# Patient Record
Sex: MALE | Race: WHITE | Employment: STUDENT | URBAN - METROPOLITAN AREA
[De-identification: names, ages, dates, MRNs, and addresses within clinical notes are randomized per-mention and may not be internally consistent; named-entity substitution may affect disease eponyms.]

---
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
## 2022-02-15 ENCOUNTER — OFFICE VISIT (OUTPATIENT)
Dept: FAMILY MEDICINE CLINIC | Facility: CLINIC | Age: 22
End: 2022-02-15
Payer: COMMERCIAL

## 2022-02-15 VITALS
WEIGHT: 162.19 LBS | RESPIRATION RATE: 14 BRPM | DIASTOLIC BLOOD PRESSURE: 72 MMHG | SYSTOLIC BLOOD PRESSURE: 104 MMHG | TEMPERATURE: 98 F | HEART RATE: 76 BPM | BODY MASS INDEX: 25 KG/M2

## 2022-02-15 DIAGNOSIS — F90.9 ADHD (ATTENTION DEFICIT HYPERACTIVITY DISORDER): ICD-10-CM

## 2022-02-15 PROCEDURE — 3078F DIAST BP <80 MM HG: CPT | Performed by: FAMILY MEDICINE

## 2022-02-15 PROCEDURE — 99214 OFFICE O/P EST MOD 30 MIN: CPT | Performed by: FAMILY MEDICINE

## 2022-02-15 PROCEDURE — 3074F SYST BP LT 130 MM HG: CPT | Performed by: FAMILY MEDICINE

## 2022-02-15 RX ORDER — DEXTROAMPHETAMINE SACCHARATE, AMPHETAMINE ASPARTATE MONOHYDRATE, DEXTROAMPHETAMINE SULFATE AND AMPHETAMINE SULFATE 2.5; 2.5; 2.5; 2.5 MG/1; MG/1; MG/1; MG/1
10 CAPSULE, EXTENDED RELEASE ORAL EVERY MORNING
Qty: 30 CAPSULE | Refills: 0 | Status: SHIPPED | OUTPATIENT
Start: 2022-02-15 | End: 2022-03-17

## 2022-02-15 RX ORDER — FLUTICASONE PROPIONATE 50 MCG
1-2 SPRAY, SUSPENSION (ML) NASAL DAILY
COMMUNITY
Start: 2022-02-05

## 2022-06-06 ENCOUNTER — OFFICE VISIT (OUTPATIENT)
Dept: FAMILY MEDICINE CLINIC | Facility: CLINIC | Age: 22
End: 2022-06-06
Payer: COMMERCIAL

## 2022-06-06 ENCOUNTER — PATIENT MESSAGE (OUTPATIENT)
Dept: FAMILY MEDICINE CLINIC | Facility: CLINIC | Age: 22
End: 2022-06-06

## 2022-06-06 VITALS
HEART RATE: 74 BPM | BODY MASS INDEX: 25.54 KG/M2 | RESPIRATION RATE: 16 BRPM | TEMPERATURE: 98 F | OXYGEN SATURATION: 98 % | SYSTOLIC BLOOD PRESSURE: 116 MMHG | WEIGHT: 155.13 LBS | DIASTOLIC BLOOD PRESSURE: 80 MMHG | HEIGHT: 65.25 IN

## 2022-06-06 DIAGNOSIS — Z00.00 ROUTINE HEALTH MAINTENANCE: Primary | ICD-10-CM

## 2022-06-06 PROCEDURE — 90715 TDAP VACCINE 7 YRS/> IM: CPT | Performed by: FAMILY MEDICINE

## 2022-06-06 PROCEDURE — 99395 PREV VISIT EST AGE 18-39: CPT | Performed by: FAMILY MEDICINE

## 2022-06-06 PROCEDURE — 3008F BODY MASS INDEX DOCD: CPT | Performed by: FAMILY MEDICINE

## 2022-06-06 PROCEDURE — 3074F SYST BP LT 130 MM HG: CPT | Performed by: FAMILY MEDICINE

## 2022-06-06 PROCEDURE — 3079F DIAST BP 80-89 MM HG: CPT | Performed by: FAMILY MEDICINE

## 2022-06-06 PROCEDURE — 90471 IMMUNIZATION ADMIN: CPT | Performed by: FAMILY MEDICINE

## 2022-06-06 RX ORDER — LISDEXAMFETAMINE DIMESYLATE 10 MG/1
10 CAPSULE ORAL DAILY
Qty: 10 CAPSULE | Refills: 0 | Status: SHIPPED | OUTPATIENT
Start: 2022-06-06 | End: 2022-06-16

## 2022-06-06 NOTE — TELEPHONE ENCOUNTER
From: Dhruv Davenport  To: Janes Bloom DO  Sent: 6/6/2022 5:52 PM CDT  Subject: [facility] [facility] health forms    I found page C

## 2022-06-08 ENCOUNTER — PATIENT MESSAGE (OUTPATIENT)
Dept: FAMILY MEDICINE CLINIC | Facility: CLINIC | Age: 22
End: 2022-06-08

## 2022-06-08 NOTE — TELEPHONE ENCOUNTER
From: Tyron Davenport  To: Camille Silva DO  Sent: 6/8/2022 8:15 AM CDT  Subject: See if that works     [email or web address]

## 2022-06-20 RX ORDER — LISDEXAMFETAMINE DIMESYLATE 10 MG/1
10 CAPSULE ORAL DAILY
Qty: 30 CAPSULE | Refills: 0 | Status: SHIPPED | OUTPATIENT
Start: 2022-06-20 | End: 2022-07-20

## 2022-06-20 NOTE — TELEPHONE ENCOUNTER
Pt states that he feels the vyvanse works better in regard to the other medication- he states he can focus and still sleep at night. Pt states he could try to a high dose at this time- and it if it is too much he can let us know? But he does feel like this medication is better than the adderall.

## 2022-06-20 NOTE — TELEPHONE ENCOUNTER
Lisdexamfetamine Dimesylate (VYVANSE) 10 MG Oral Cap [170201]       PT WOULD LIKE REFILL SENT TO  [facility] [address]. [phone], [phone]

## 2022-07-19 RX ORDER — LISDEXAMFETAMINE DIMESYLATE 10 MG/1
10 CAPSULE ORAL DAILY
Qty: 30 CAPSULE | Refills: 0 | Status: SHIPPED | OUTPATIENT
Start: 2022-07-19 | End: 2022-08-18

## 2022-12-27 ENCOUNTER — OFFICE VISIT (OUTPATIENT)
Dept: FAMILY MEDICINE CLINIC | Facility: CLINIC | Age: 22
End: 2022-12-27
Payer: COMMERCIAL

## 2022-12-27 VITALS
HEART RATE: 84 BPM | DIASTOLIC BLOOD PRESSURE: 72 MMHG | SYSTOLIC BLOOD PRESSURE: 112 MMHG | BODY MASS INDEX: 25 KG/M2 | OXYGEN SATURATION: 95 % | TEMPERATURE: 99 F | WEIGHT: 150 LBS | RESPIRATION RATE: 18 BRPM

## 2022-12-27 DIAGNOSIS — J06.9 VIRAL URI: ICD-10-CM

## 2022-12-27 DIAGNOSIS — J02.8 PHARYNGITIS DUE TO OTHER ORGANISM: Primary | ICD-10-CM

## 2022-12-27 DIAGNOSIS — R50.81 FEVER IN OTHER DISEASES: ICD-10-CM

## 2022-12-27 LAB
CONTROL LINE PRESENT WITH A CLEAR BACKGROUND (YES/NO): YES YES/NO
KIT LOT #: 2554 NUMERIC

## 2022-12-27 PROCEDURE — 3078F DIAST BP <80 MM HG: CPT | Performed by: FAMILY MEDICINE

## 2022-12-27 PROCEDURE — 99213 OFFICE O/P EST LOW 20 MIN: CPT | Performed by: FAMILY MEDICINE

## 2022-12-27 PROCEDURE — 3074F SYST BP LT 130 MM HG: CPT | Performed by: FAMILY MEDICINE

## 2022-12-27 PROCEDURE — 87880 STREP A ASSAY W/OPTIC: CPT | Performed by: FAMILY MEDICINE

## 2023-02-23 ENCOUNTER — OFFICE VISIT (OUTPATIENT)
Dept: FAMILY MEDICINE CLINIC | Facility: CLINIC | Age: 23
End: 2023-02-23
Payer: COMMERCIAL

## 2023-02-23 VITALS
RESPIRATION RATE: 18 BRPM | SYSTOLIC BLOOD PRESSURE: 104 MMHG | BODY MASS INDEX: 25 KG/M2 | TEMPERATURE: 97 F | DIASTOLIC BLOOD PRESSURE: 72 MMHG | OXYGEN SATURATION: 99 % | HEART RATE: 72 BPM | WEIGHT: 153.25 LBS

## 2023-02-23 DIAGNOSIS — J01.00 SUBACUTE MAXILLARY SINUSITIS: Primary | ICD-10-CM

## 2023-02-23 DIAGNOSIS — J02.9 PHARYNGITIS, UNSPECIFIED ETIOLOGY: ICD-10-CM

## 2023-02-23 PROCEDURE — 3078F DIAST BP <80 MM HG: CPT | Performed by: FAMILY MEDICINE

## 2023-02-23 PROCEDURE — 99213 OFFICE O/P EST LOW 20 MIN: CPT | Performed by: FAMILY MEDICINE

## 2023-02-23 PROCEDURE — 3074F SYST BP LT 130 MM HG: CPT | Performed by: FAMILY MEDICINE

## 2023-02-23 RX ORDER — DOXYCYCLINE HYCLATE 100 MG
100 TABLET ORAL 2 TIMES DAILY
Qty: 20 TABLET | Refills: 0 | Status: SHIPPED | OUTPATIENT
Start: 2023-02-23 | End: 2023-03-05

## 2023-05-08 ENCOUNTER — HOSPITAL ENCOUNTER (EMERGENCY)
Age: 23
Discharge: HOME | End: 2023-05-08

## 2023-05-08 VITALS
WEIGHT: 157.19 LBS | RESPIRATION RATE: 18 BRPM | BODY MASS INDEX: 25.26 KG/M2 | DIASTOLIC BLOOD PRESSURE: 87 MMHG | OXYGEN SATURATION: 98 % | TEMPERATURE: 98.1 F | SYSTOLIC BLOOD PRESSURE: 130 MMHG | HEART RATE: 80 BPM | HEIGHT: 66 IN

## 2023-05-08 DIAGNOSIS — S61.210A LACERATION OF RIGHT INDEX FINGER WITHOUT FOREIGN BODY WITHOUT DAMAGE TO NAIL, INITIAL ENCOUNTER: Primary | ICD-10-CM

## 2023-05-08 PROCEDURE — 99283 EMERGENCY DEPT VISIT LOW MDM: CPT | Performed by: PHYSICIAN ASSISTANT

## 2023-05-08 PROCEDURE — 99283 EMERGENCY DEPT VISIT LOW MDM: CPT

## 2023-05-08 ASSESSMENT — PAIN DESCRIPTION - PAIN TYPE: TYPE: ACUTE PAIN

## 2023-05-08 ASSESSMENT — PAIN SCALES - GENERAL: PAINLEVEL_OUTOF10: 4

## 2023-07-05 ENCOUNTER — HOSPITAL ENCOUNTER (EMERGENCY)
Age: 23
Discharge: HOME | End: 2023-07-05
Attending: EMERGENCY MEDICINE

## 2023-07-05 VITALS
RESPIRATION RATE: 16 BRPM | SYSTOLIC BLOOD PRESSURE: 126 MMHG | DIASTOLIC BLOOD PRESSURE: 72 MMHG | TEMPERATURE: 97.9 F | HEART RATE: 87 BPM | WEIGHT: 157.63 LBS | OXYGEN SATURATION: 98 %

## 2023-07-05 DIAGNOSIS — S09.21XA TRAUMATIC RUPTURE OF RIGHT EAR DRUM, INITIAL ENCOUNTER: Primary | ICD-10-CM

## 2023-07-05 PROCEDURE — 10002803 HB RX 637: Performed by: EMERGENCY MEDICINE

## 2023-07-05 PROCEDURE — 99283 EMERGENCY DEPT VISIT LOW MDM: CPT | Performed by: EMERGENCY MEDICINE

## 2023-07-05 PROCEDURE — 99282 EMERGENCY DEPT VISIT SF MDM: CPT

## 2023-07-05 RX ORDER — NEOMYCIN SULFATE, POLYMYXIN B SULFATE AND HYDROCORTISONE 10; 3.5; 1 MG/ML; MG/ML; [USP'U]/ML
3 SUSPENSION/ DROPS AURICULAR (OTIC) EVERY 6 HOURS SCHEDULED
Status: DISCONTINUED | OUTPATIENT
Start: 2023-07-05 | End: 2023-07-06 | Stop reason: HOSPADM

## 2023-07-05 RX ADMIN — NEOMYCIN SULFATE, POLYMYXIN B SULFATE AND HYDROCORTISONE 3 DROP: 10; 3.5; 1 SUSPENSION/ DROPS AURICULAR (OTIC) at 21:45

## 2023-07-05 ASSESSMENT — ENCOUNTER SYMPTOMS
NUMBNESS: 0
DIZZINESS: 0
WEAKNESS: 0
HEADACHES: 0
TREMORS: 0
FACIAL ASYMMETRY: 0
SEIZURES: 0
SPEECH DIFFICULTY: 0
LIGHT-HEADEDNESS: 0

## 2023-07-05 ASSESSMENT — PAIN SCALES - GENERAL: PAINLEVEL_OUTOF10: 0

## 2023-07-11 ENCOUNTER — OFFICE VISIT (OUTPATIENT)
Dept: INTERNAL MEDICINE | Age: 23
End: 2023-07-11

## 2023-07-11 VITALS
SYSTOLIC BLOOD PRESSURE: 118 MMHG | BODY MASS INDEX: 24.57 KG/M2 | DIASTOLIC BLOOD PRESSURE: 76 MMHG | OXYGEN SATURATION: 98 % | HEART RATE: 94 BPM | WEIGHT: 156.53 LBS | TEMPERATURE: 97.9 F | HEIGHT: 67 IN

## 2023-07-11 DIAGNOSIS — G89.29 CHRONIC MID BACK PAIN: ICD-10-CM

## 2023-07-11 DIAGNOSIS — M54.9 CHRONIC MID BACK PAIN: ICD-10-CM

## 2023-07-11 DIAGNOSIS — Z00.00 PREVENTATIVE HEALTH CARE: Primary | ICD-10-CM

## 2023-07-11 PROBLEM — Z86.59 HISTORY OF ADHD: Status: ACTIVE | Noted: 2023-07-11

## 2023-07-11 PROBLEM — Z86.59 HISTORY OF ADHD: Status: RESOLVED | Noted: 2023-07-11 | Resolved: 2023-07-11

## 2023-07-11 PROCEDURE — 99204 OFFICE O/P NEW MOD 45 MIN: CPT | Performed by: INTERNAL MEDICINE

## 2023-07-11 RX ORDER — ACETAMINOPHEN 500 MG
500 TABLET ORAL EVERY 4 HOURS PRN
COMMUNITY

## 2023-07-11 RX ORDER — IBUPROFEN 800 MG/1
400 TABLET ORAL EVERY 8 HOURS PRN
COMMUNITY
Start: 2023-04-13

## 2023-07-17 ENCOUNTER — TELEPHONE (OUTPATIENT)
Dept: INTERNAL MEDICINE | Age: 23
End: 2023-07-17

## 2023-07-17 DIAGNOSIS — G89.29 CHRONIC MID BACK PAIN: Primary | ICD-10-CM

## 2023-07-17 DIAGNOSIS — M54.9 CHRONIC MID BACK PAIN: Primary | ICD-10-CM

## 2023-07-19 ENCOUNTER — APPOINTMENT (OUTPATIENT)
Dept: REHABILITATION | Age: 23
End: 2023-07-19
Attending: INTERNAL MEDICINE

## 2023-07-27 ENCOUNTER — TELEPHONE (OUTPATIENT)
Dept: REHABILITATION | Age: 23
End: 2023-07-27

## 2023-12-31 ENCOUNTER — WALK IN (OUTPATIENT)
Dept: URGENT CARE | Age: 23
End: 2023-12-31

## 2023-12-31 ENCOUNTER — TELEPHONE (OUTPATIENT)
Dept: URGENT CARE | Age: 23
End: 2023-12-31

## 2023-12-31 VITALS
SYSTOLIC BLOOD PRESSURE: 104 MMHG | WEIGHT: 164.35 LBS | HEART RATE: 62 BPM | TEMPERATURE: 97.8 F | BODY MASS INDEX: 25.8 KG/M2 | OXYGEN SATURATION: 96 % | DIASTOLIC BLOOD PRESSURE: 61 MMHG | HEIGHT: 67 IN | RESPIRATION RATE: 16 BRPM

## 2023-12-31 DIAGNOSIS — J02.9 SORE THROAT: Primary | ICD-10-CM

## 2023-12-31 LAB
S PYO DNA THROAT QL NAA+PROBE: NOT DETECTED
TEST LOT EXPIRATION DATE: NORMAL
TEST LOT NUMBER: NORMAL

## 2023-12-31 PROCEDURE — 87651 STREP A DNA AMP PROBE: CPT | Performed by: NURSE PRACTITIONER

## 2023-12-31 PROCEDURE — 0241U COVID/FLU/RSV PANEL: CPT | Performed by: CLINICAL MEDICAL LABORATORY

## 2023-12-31 PROCEDURE — 99203 OFFICE O/P NEW LOW 30 MIN: CPT | Performed by: NURSE PRACTITIONER

## 2024-01-01 ENCOUNTER — TELEPHONE (OUTPATIENT)
Dept: URGENT CARE | Age: 24
End: 2024-01-01

## 2024-01-01 LAB
FLUAV RNA RESP QL NAA+PROBE: NOT DETECTED
FLUBV RNA RESP QL NAA+PROBE: NOT DETECTED
RSV AG NPH QL IA.RAPID: NOT DETECTED
SARS-COV-2 N GENE CT SPEC QN NAA N2: 19.1
SARS-COV-2 RNA RESP QL NAA+PROBE: DETECTED
SERVICE CMNT-IMP: ABNORMAL

## 2024-05-01 ENCOUNTER — APPOINTMENT (OUTPATIENT)
Dept: OPHTHALMOLOGY | Age: 24
End: 2024-05-01

## 2024-05-09 ENCOUNTER — APPOINTMENT (OUTPATIENT)
Dept: OPHTHALMOLOGY | Age: 24
End: 2024-05-09

## 2024-05-09 DIAGNOSIS — H52.13 MYOPIA OF BOTH EYES WITH ASTIGMATISM: Primary | ICD-10-CM

## 2024-05-09 DIAGNOSIS — H52.203 MYOPIA OF BOTH EYES WITH ASTIGMATISM: Primary | ICD-10-CM

## 2024-05-09 DIAGNOSIS — H52.7 DISORDER OF REFRACTION: Primary | ICD-10-CM

## 2024-05-09 PROCEDURE — 92015 DETERMINE REFRACTIVE STATE: CPT

## 2024-05-09 PROCEDURE — 92004 COMPRE OPH EXAM NEW PT 1/>: CPT

## 2024-05-09 PROCEDURE — 92310 CONTACT LENS FITTING OU: CPT

## 2024-05-09 ASSESSMENT — REFRACTION_WEARINGRX
OS_AXIS: 122
OS_CYLINDER: -0.50
OD_SPHERE: -3.25
OS_SPHERE: -3.00

## 2024-05-09 ASSESSMENT — KERATOMETRY
OD_AXISANGLE_DEGREES: 80
OS_AXISANGLE2_DEGREES: 150
OD_K2POWER_DIOPTERS: 42.75
OS_K2POWER_DIOPTERS: 42.50
OS_K1POWER_DIOPTERS: 42.25
OS_AXISANGLE_DEGREES: 60
OD_AXISANGLE2_DEGREES: 170
OD_K1POWER_DIOPTERS: 42.50

## 2024-05-09 ASSESSMENT — REFRACTION_MANIFEST
OD_SPHERE: -3.50
METHOD_AUTOREFRACTION: 1
OS_AXIS: 120
OD_AXIS: 015
OS_CYLINDER: -0.75
OS_SPHERE: -3.00
OD_CYLINDER: -0.25

## 2024-05-09 ASSESSMENT — REFRACTION_CURRENTRX
OS_SPHERE: -3.25
OS_DIAMETER: 14.0
OD_SPHERE: -3.50
OS_BASECURVE: 8.4
OS_BRAND: ACUVUE OASYS
OS_DIAMETER: 14.0
OD_BASECURVE: 8.4
OS_BRAND: ACUVUE OASYS
OD_BRAND: ACUVUE OASYS
OS_SPHERE: -3.50
OD_DIAMETER: 14.0
OS_BASECURVE: 8.4
OD_BASECURVE: 8.4
OD_DIAMETER: 14.0
OD_BRAND: ACUVUE OASYS
OD_SPHERE: -3.25

## 2024-05-09 ASSESSMENT — CONF VISUAL FIELD
OD_INFERIOR_TEMPORAL_RESTRICTION: 0
OS_SUPERIOR_NASAL_RESTRICTION: 0
OS_INFERIOR_TEMPORAL_RESTRICTION: 0
OD_SUPERIOR_NASAL_RESTRICTION: 0
OD_INFERIOR_NASAL_RESTRICTION: 0
OS_NORMAL: 1
OD_SUPERIOR_TEMPORAL_RESTRICTION: 0
OD_NORMAL: 1
OS_SUPERIOR_TEMPORAL_RESTRICTION: 0
OS_INFERIOR_NASAL_RESTRICTION: 0

## 2024-05-09 ASSESSMENT — REFRACTION
OD_SPHERE: -3.75
OS_SPHERE: -3.25
OS_CYLINDER: -0.50
OD_AXIS: 034
OS_AXIS: 128
OD_CYLINDER: -0.25

## 2024-05-09 ASSESSMENT — VISUAL ACUITY
METHOD: SNELLEN- LINEAR
VA_OR_OS_CURRENT_RX: 20/20
OS_CC: 20/20
VA_OR_OD_CURRENT_RX: 20/20
OD_CC: 20/25
OS_SC: 20/400
OD_SC: 20/400
CORRECTION_TYPE: GLASSES

## 2024-05-09 ASSESSMENT — SLIT LAMP EXAM - LIDS
COMMENTS: NORMAL
COMMENTS: NORMAL

## 2024-05-09 ASSESSMENT — EXTERNAL EXAM - LEFT EYE: OS_EXAM: NORMAL

## 2024-05-09 ASSESSMENT — EXTERNAL EXAM - RIGHT EYE: OD_EXAM: NORMAL

## 2024-05-09 ASSESSMENT — TONOMETRY
OS_IOP_MMHG: 17
OD_IOP_MMHG: 17

## 2024-05-09 ASSESSMENT — CUP TO DISC RATIO
OS_RATIO: 0.25
OD_RATIO: 0.3

## (undated) NOTE — LETTER
State [facility] [employer] of Child Health Examination       Student's Name  Jeremiah Macias D Signature                                                                                                                                              Title                           Date    (If adding dates to the above immunization history section, put y ALLERGIES  (Food, drug, insect, other)  Pcn [Penicillins] MEDICATION  (List all prescribed or taken on a regular basis.)  No current outpatient prescriptions on file. Diagnosis of asthma?   Child wakes during the night coughing   Yes   No    Yes   No    L DIABETES SCREENING  BMI>85% age/sex  No And any two of the following:  Family History No    Ethnic Minority  No          Signs of Insulin Resistance (hypertension, dyslipidemia, polycystic ovarian syndrome, acanthosis nigricans)    No           At Risk  No Quick-relief  medication (e.g. Short Acting Beta Antagonist): No          Controller medication (e.g. inhaled corticosteroid):   No Other   NEEDS/MODIFICATIONS required in the school setting  None DIETARY Needs/Restrictions     None   SPECIAL INSTR

## (undated) NOTE — LETTER
[address] [facility],[address]  [address] [facility] [address]    2/22/2019      Dear  Robin Strange    In order to provide the highest quality care, MICHEAL Vuong uses a sophisticated computer system to track our patient's r

## (undated) NOTE — MR AVS SNAPSHOT
[address] [facility] [id number]  [phone]               Thank you for choosing us for your health care visit with Vanessa Varela DO.   We are glad to serve you and happy to provide you with this sum Healthy Active Living  An initiative of the American Academy of Pediatrics    Fact Sheet: Healthy Active Living for Families    Healthy nutrition starts as early as infancy with breastfeeding.  Once your baby begins eating solid foods, introduce nutritious Sofi.tn